# Patient Record
Sex: MALE | Race: OTHER | Employment: OTHER
[De-identification: names, ages, dates, MRNs, and addresses within clinical notes are randomized per-mention and may not be internally consistent; named-entity substitution may affect disease eponyms.]

---

## 2022-03-18 PROBLEM — R55 SYNCOPE AND COLLAPSE: Status: ACTIVE | Noted: 2019-04-24

## 2022-03-19 PROBLEM — S00.93XA TRAUMATIC CEPHALOHEMATOMA: Status: ACTIVE | Noted: 2019-04-24

## 2022-03-19 PROBLEM — S06.6XAA SUBARACHNOID HEMORRHAGE FOLLOWING INJURY (HCC): Status: ACTIVE | Noted: 2019-04-24

## 2024-01-03 ENCOUNTER — HOSPITAL ENCOUNTER (OUTPATIENT)
Facility: HOSPITAL | Age: 54
Setting detail: RECURRING SERIES
Discharge: HOME | End: 2024-01-06
Payer: COMMERCIAL

## 2024-01-03 PROCEDURE — 97161 PT EVAL LOW COMPLEX 20 MIN: CPT

## 2024-01-03 PROCEDURE — 97162 PT EVAL MOD COMPLEX 30 MIN: CPT

## 2024-01-03 NOTE — THERAPY EVALUATION
[facility]  [address] [place] [address]  Phone: [phone]   [phone]  Plan of Care / Statement of Necessity for Physical Therapy Services     Patient Name: Reinier Rossi : 1970   Medical   Diagnosis: Cervicalgia [M54.2] Treatment Diagnosis:   M54.2  NECK PAIN    Onset Date:      Referral Source: Radha Ca MD Start of Care (SOC): 1/3/2024   Prior Hospitalization: See medical history Provider #: [id number]   Prior Level of Function: Indep, works full time construction, [employer]   Comorbidities: Smoking > 40 years (1/2 pack a day), h/o HA, depression, chronic rhinitis, B sensorineural hearing loss      Assessment / key information:      Pt is a 53 y.o. year old RHD male with subjective complaints of  \"constant feeling of something behind the R side of jaw, feels like a bruise when touched, radiates into the jaw with interesting sensation at front of teeth.\" Pt reports that he is unable to recall when his pain initially started but feels it was in 2019 after a fall. Notes that he was severely dehydrated, passed out, and fell backwards, hitting his head. Pt did go to the ER for that and was d/c following medical clearance, CT Head performed. (See chart for further details)Pt notes that he has a h/o of \"sinus problems and allergies\" as well as a h/o of headaches. HA mostly occurred at least 1x/mo with sensitivity to light, sound, and presence of pain on the R side of neck, shoulder, and jaw, treated by ice and stretching. Over the last year, his HA have increased and pt with presence of pressure/pain in the R ear with muffled hearing. He also notes having lesions in his mouth with drainage of blood that he was told were \"canker sores\".  He also reports an enlarged \"lymph node\" on the R side of his neck/jaw line. Pt has seen multiple physicians for \"multiple opinions\" noting that he has been to PCP, dentist, and ENT and was

## 2024-01-03 NOTE — PROGRESS NOTES
PHYSICAL / OCCUPATIONAL THERAPY - DAILY TREATMENT NOTE (updated )  For Eval visit    Patient Name: Reinier Rossi    Date: 1/3/2024    : 1970  Insurance: Payor: /      Patient  verified yes     Visit #   Current / Total 1 12   Time   In / Out 1029 1110   Pain   In / Out 0/10 0/10   Subjective Functional Status/Changes: See POC     TREATMENT AREA =  see POC    OBJECTIVE        30 min   Eval - untimed                      Therapeutic Procedures:    Tx Min Billable or 1:1 Min (if diff from Tx Min) Procedure, Rationale, Specifics   7 [place] [address] Manual Therapy (timed):  decrease pain, increase ROM, increase tissue extensibility, decrease trigger points, and increase postural awareness to improve patient's ability to progress to PLOF and address remaining functional goals.  The manual therapy interventions were performed at a separate and distinct time from the therapeutic activities interventions . (see flow sheet as applicable)     Details if applicable:       5 [place] [address] Self Care/Home Management (timed):  improve patient knowledge and understanding of pain reducing techniques, positioning, posture/ergonomics, home safety, activity modification, diagnosis/prognosis, and physical therapy expectations, procedures and progression  to improve patient's ability to progress to PLOF and address remaining functional goals.  (see flow sheet as applicable)     Details if applicable:            Details if applicable:            Details if applicable:            Details if applicable:     11 (eval only) [facility] Totals Reminder: bill using total billable min of TIMED therapeutic procedures (example: do not include dry needle or estim unattended, both untimed codes, in totals to left)  8-22 min = 1 unit; 23-37 min = 2 units; 38-52 min = 3 units; 53-67 min = 4 units; 68-82 min = 5 units   Total Total     [x]  Patient Education billed concurrently with other procedures   [] Review HEP    [] Progressed/Changed HEP,

## 2024-01-08 ENCOUNTER — HOSPITAL ENCOUNTER (OUTPATIENT)
Facility: HOSPITAL | Age: 54
Setting detail: RECURRING SERIES
Discharge: HOME | End: 2024-01-11
Payer: COMMERCIAL

## 2024-01-08 PROCEDURE — 97110 THERAPEUTIC EXERCISES: CPT

## 2024-01-08 PROCEDURE — 97140 MANUAL THERAPY 1/> REGIONS: CPT

## 2024-01-08 PROCEDURE — 97535 SELF CARE MNGMENT TRAINING: CPT

## 2024-01-08 NOTE — PROGRESS NOTES
address imbalance/dizziness, and instruct in home and community integration to address functional deficits and attain remaining goals.    Progress toward goals / Updated goals:  []  See POC  Short Term Goals: To be accomplished in  1-2  weeks:  1. Independent with HEP.  EVAL: NA  1/8/24: HEP issued  2. Decrease max pain 25-50% to assist with tolerance to daily activity.  EVAL: 0-4/10  1/8/24: Pt denied any pain before/after tx session  3. Pt will report no radicular S&S in the UE or jaw.   Eval: intermittent n/t in the RUE, along ulnar nn distribution, and reports of \"heaviness\" in the jaw, pain posterior jaw on the R  1/8/24: Pt denied any radicular S&S today in the UE, noted reduced feeling of Heaviness in the jaw overall     Long Term Goals: To be accomplished in  4-6  weeks:  1.  Decrease max pain 50-75% to assist with daily activity tolerance.  EVAL: 0-4/10  2.  Improved CV endurance test to 10-15 sec in order to improve postural awareness with functional tasks.  EVAL: 8 seconds  3.  Pt will demo improved c/s Rotation to 80 deg bilaterally in order to improve tolerance to looking over shoulder while driving, performing work duties.   EVAL:  ROT R 60, L 65    PLAN  yes Continue plan of care  []  Other:      Alicia Rudolph PT    1/8/2024    9:48 AM    Future Appointments   Date Time Provider Department Center   1/8/2024  9:00 AM Alicia Rudolph PT [facility]   1/10/2024  9:00 AM Stephie Rodriguez PT [facility]   1/15/2024  9:00 AM Khanh Newell PTA [facility]   1/18/2024  9:40 AM Alicia Rudolph PT [facility]   1/22/2024  9:00 AM Khanh Newell PTA [facility]   1/24/2024  9:00 AM Alicia Rudolph PT [facility]   1/29/2024  9:40 AM Khanh Newell PTA [facility]   1/31/2024  9:40 AM Vic Ansari, PT [facility]

## 2024-01-09 NOTE — PROGRESS NOTES
PHYSICAL / OCCUPATIONAL THERAPY - DAILY TREATMENT NOTE (updated )  For Eval visit    Patient Name: Reinier Rossi    Date: 1/10/2024    : 1970  Insurance: Payor: [employer] / Plan: [employer] [facility] [employer] / Product Type: *No Product type* /      Patient  verified yes     Visit #   Current / Total 3 12   Time   In / Out 9:42 10:22   Pain   In / Out 0 0   Subjective Functional Status/Changes: Pt states he had a \"little catch\" to left ant ribs after last session that lasted a couple hours and resolved with some stretching. Yesterday he had L shoulder and mid back pain up to 2/10 \"it was just irritating\". States the pressure in his jaw is now equalized and ear's not as stuffy on R.     TREATMENT AREA =  M54.2  NECK PAIN     OBJECTIVE      Therapeutic Procedures:    Tx Min Billable or 1:1 Min (if diff from Tx Min) Procedure, Rationale, Specifics   10 10 70433 Manual Therapy (timed):  decrease pain, increase ROM, increase tissue extensibility, decrease trigger points, and increase postural awareness to improve patient's ability to progress to PLOF and address remaining functional goals.  The manual therapy interventions were performed at a separate and distinct time from the therapeutic activities interventions . (see flow sheet as applicable)     Details if applicable:   suboccipital release, STM R>L c/s paraspinals with TpR R TP C6 and C2. Manual R LS stretch.    30 30 08890 Therapeutic Exercise (timed):  increase ROM, strength, coordination, balance, and proprioception to improve patient's ability to progress to PLOF and address remaining functional goals. (see flow sheet as applicable)     Details if applicable:            Details if applicable:            Details if applicable:            Details if applicable:     40 40 [facility] Totals Reminder: bill using total billable min of TIMED therapeutic procedures (example: do not include dry needle or estim unattended, both untimed codes, in totals to

## 2024-01-10 ENCOUNTER — HOSPITAL ENCOUNTER (OUTPATIENT)
Facility: HOSPITAL | Age: 54
Setting detail: RECURRING SERIES
Discharge: HOME | End: 2024-01-13
Payer: COMMERCIAL

## 2024-01-10 PROCEDURE — 97140 MANUAL THERAPY 1/> REGIONS: CPT

## 2024-01-10 PROCEDURE — 97110 THERAPEUTIC EXERCISES: CPT

## 2024-01-15 ENCOUNTER — HOSPITAL ENCOUNTER (OUTPATIENT)
Facility: HOSPITAL | Age: 54
Setting detail: RECURRING SERIES
Discharge: HOME | End: 2024-01-18
Payer: COMMERCIAL

## 2024-01-15 PROCEDURE — 97110 THERAPEUTIC EXERCISES: CPT

## 2024-01-15 PROCEDURE — 97140 MANUAL THERAPY 1/> REGIONS: CPT

## 2024-01-15 NOTE — PROGRESS NOTES
PHYSICAL / OCCUPATIONAL THERAPY - DAILY TREATMENT NOTE    Patient Name: Reinier Rossi    Date: 1/15/2024    : 1970  Insurance: Payor: [employer]H / Plan: [employer] / Product Type: *No Product type* /      Patient  verified Yes     Visit #   Current / Total 4 12   Time   In / Out 902 955   Pain   In / Out 0 0   Subjective Functional Status/Changes: Pt states neck feels about the same today. Reports no pain or headaches.     TREATMENT AREA =  Cervicalgia [M54.2]    OBJECTIVE         Therapeutic Procedures:    Tx Min Billable or 1:1 Min (if diff from Tx Min) Procedure, Rationale, Specifics   38  23908 Therapeutic Exercise (timed):  increase ROM, strength, coordination, balance, and proprioception to improve patient's ability to progress to PLOF and address remaining functional goals. (see flow sheet as applicable)     Details if applicable:       15  24941 Manual Therapy (timed):  decrease pain, increase ROM, increase tissue extensibility, decrease trigger points, and increase postural awareness to improve patient's ability to progress to PLOF and address remaining functional goals.  The manual therapy interventions were performed at a separate and distinct time from the therapeutic activities interventions . (see flow sheet as applicable)     Details if applicable:  suboccipital release, STM R>L c/s paraspinals with TpR R TP C6 and C2. Manual R LS stretch.            Details if applicable:            Details if applicable:            Details if applicable:     53  [facility] Totals Reminder: bill using total billable min of TIMED therapeutic procedures (example: do not include dry needle or estim unattended, both untimed codes, in totals to left)  8-22 min = 1 unit; 23-37 min = 2 units; 38-52 min = 3 units; 53-67 min = 4 units; 68-82 min = 5 units   Total Total     [x]  Patient Education billed concurrently with other procedures   [x] Review HEP    [] Progressed/Changed HEP, detail:    [] Other

## 2024-01-18 ENCOUNTER — HOSPITAL ENCOUNTER (OUTPATIENT)
Facility: HOSPITAL | Age: 54
Setting detail: RECURRING SERIES
Discharge: HOME | End: 2024-01-21
Payer: COMMERCIAL

## 2024-01-18 PROCEDURE — 97112 NEUROMUSCULAR REEDUCATION: CPT

## 2024-01-18 PROCEDURE — 97110 THERAPEUTIC EXERCISES: CPT

## 2024-01-18 NOTE — PROGRESS NOTES
PHYSICAL / OCCUPATIONAL THERAPY - DAILY TREATMENT NOTE    Patient Name: Reinier Rossi    Date: 2024    : 1970  Insurance: Payor: [employer]H / Plan: [employer] / Product Type: *No Product type* /      Patient  verified Yes     Visit #   Current / Total 5 12   Time   In / Out 942 1028   Pain   In / Out 0 0   Subjective Functional Status/Changes: Pt reports no pain or discomfort today. Reports soreness after new exercises last visit. States he has been more consistent with stretching at home.     TREATMENT AREA =  Cervicalgia [M54.2]    OBJECTIVE         Therapeutic Procedures:    Tx Min Billable or 1:1 Min (if diff from Tx Min) Procedure, Rationale, Specifics   30  95427 Therapeutic Exercise (timed):  increase ROM, strength, coordination, balance, and proprioception to improve patient's ability to progress to PLOF and address remaining functional goals. (see flow sheet as applicable)     Details if applicable:            16  20131 Neuromuscular Re-Education (timed):  improve balance, coordination, kinesthetic sense, posture, core stability and proprioception to improve patient's ability to develop conscious control of individual muscles and awareness of position of extremities in order to progress to PLOF and address remaining functional goals. (see flow sheet as applicable)     Details if applicable:     X  59686 Therapeutic Activity (timed):  use of dynamic activities replicating functional movements to increase ROM, strength, coordination, balance, and proprioception in order to improve patient's ability to progress to PLOF and address remaining functional goals.  (see flow sheet as applicable)     Details if applicable:            Details if applicable:     46  [facility] Totals Reminder: bill using total billable min of TIMED therapeutic procedures (example: do not include dry needle or estim unattended, both untimed codes, in totals to left)  8-22 min = 1 unit; 23-37 min = 2 units; 38-52

## 2024-01-22 ENCOUNTER — HOSPITAL ENCOUNTER (OUTPATIENT)
Facility: HOSPITAL | Age: 54
Setting detail: RECURRING SERIES
Discharge: HOME | End: 2024-01-25
Payer: COMMERCIAL

## 2024-01-22 PROCEDURE — 97112 NEUROMUSCULAR REEDUCATION: CPT

## 2024-01-22 PROCEDURE — 97140 MANUAL THERAPY 1/> REGIONS: CPT

## 2024-01-22 PROCEDURE — 97110 THERAPEUTIC EXERCISES: CPT

## 2024-01-22 NOTE — PROGRESS NOTES
PHYSICAL / OCCUPATIONAL THERAPY - DAILY TREATMENT NOTE    Patient Name: Reinier Rossi    Date: 2024    : 1970  Insurance: Payor: [employer]H / Plan: [employer] [facility] [employer] / Product Type: *No Product type* /      Patient  verified Yes     Visit #   Current / Total 6 12   Time   In / Out 900 954   Pain   In / Out 0/10 0/10   Subjective Functional Status/Changes: Pt states he felt a lot looser after new exercises added last visit. States he still feels heaviness in his jaw and front of his mouth but with decreased frequency and intensity.     TREATMENT AREA =  Cervicalgia [M54.2]    OBJECTIVE         Therapeutic Procedures:    Tx Min Billable or 1:1 Min (if diff from Tx Min) Procedure, Rationale, Specifics   30  83471 Therapeutic Exercise (timed):  increase ROM, strength, coordination, balance, and proprioception to improve patient's ability to progress to PLOF and address remaining functional goals. (see flow sheet as applicable)     Details if applicable:       16  93586 Neuromuscular Re-Education (timed):  improve balance, coordination, kinesthetic sense, posture, core stability and proprioception to improve patient's ability to develop conscious control of individual muscles and awareness of position of extremities in order to progress to PLOF and address remaining functional goals. (see flow sheet as applicable)     Details if applicable:     8  60096 Manual Therapy (timed):  decrease pain, increase ROM, increase tissue extensibility, and decrease trigger points to improve patient's ability to progress to PLOF and address remaining functional goals.  The manual therapy interventions were performed at a separate and distinct time from the therapeutic activities interventions . (see flow sheet as applicable)     Details if applicable:  suboccipital release, STM R>L c/s paraspinals Manual R LS stretch.             Details if applicable:            Details if applicable:     54   [facility] Totals

## 2024-01-24 ENCOUNTER — HOSPITAL ENCOUNTER (OUTPATIENT)
Facility: HOSPITAL | Age: 54
Setting detail: RECURRING SERIES
Discharge: HOME | End: 2024-01-27
Payer: COMMERCIAL

## 2024-01-24 ENCOUNTER — APPOINTMENT (OUTPATIENT)
Facility: HOSPITAL | Age: 54
End: 2024-01-24
Payer: COMMERCIAL

## 2024-01-24 PROCEDURE — 97110 THERAPEUTIC EXERCISES: CPT

## 2024-01-24 PROCEDURE — 97112 NEUROMUSCULAR REEDUCATION: CPT

## 2024-01-24 PROCEDURE — 97140 MANUAL THERAPY 1/> REGIONS: CPT

## 2024-01-24 NOTE — PROGRESS NOTES
PHYSICAL / OCCUPATIONAL THERAPY - DAILY TREATMENT NOTE    Patient Name: Reinier Rossi    Date: 2024    : 1970  Insurance: Payor: NASH / Plan: [employer] / Product Type: *No Product type* /      Patient  verified Yes     Visit #   Current / Total 7 12   Time   In / Out 0944  1025    Pain   In / Out 0 /10 0 /10   Subjective Functional Status/Changes: Pt reports that he \"feels a little tight\" this morning, noting that he did not sleep well last night.      TREATMENT AREA =  Cervicalgia [M54.2]    OBJECTIVE         Therapeutic Procedures:    Tx Min Billable or 1:1 Min (if diff from Tx Min) Procedure, Rationale, Specifics   20  18046 Therapeutic Exercise (timed):  increase ROM, strength, coordination, balance, and proprioception to improve patient's ability to progress to PLOF and address remaining functional goals. (see flow sheet as applicable)     Details if applicable:       11  58498 Neuromuscular Re-Education (timed):  improve balance, coordination, kinesthetic sense, posture, core stability and proprioception to improve patient's ability to develop conscious control of individual muscles and awareness of position of extremities in order to progress to PLOF and address remaining functional goals. (see flow sheet as applicable)     Details if applicable:     10  68300 Manual Therapy (timed):  decrease pain, increase ROM, increase tissue extensibility, and decrease trigger points to improve patient's ability to progress to PLOF and address remaining functional goals.  The manual therapy interventions were performed at a separate and distinct time from the therapeutic activities interventions . (see flow sheet as applicable)     Details if applicable:  suboccipital release, STM R>L c/s paraspinals Manual R UT stretch, gentle OA posterior glide on the R with MET, grade II/III PA glides C2-5 followed by gentle neck ROM. STM B masseter musculature and ante-auricular space.

## 2024-01-29 ENCOUNTER — HOSPITAL ENCOUNTER (OUTPATIENT)
Facility: HOSPITAL | Age: 54
Setting detail: RECURRING SERIES
Discharge: HOME | End: 2024-02-01
Payer: COMMERCIAL

## 2024-01-29 PROCEDURE — 97530 THERAPEUTIC ACTIVITIES: CPT

## 2024-01-29 PROCEDURE — 97110 THERAPEUTIC EXERCISES: CPT

## 2024-01-29 NOTE — PROGRESS NOTES
PHYSICAL / OCCUPATIONAL THERAPY - DAILY TREATMENT NOTE    Patient Name: Reinier Rossi    Date: 2024    : 1970  Insurance: Payor: [employer]H / Plan: [employer] / Product Type: *No Product type* /      Patient  verified Yes     Visit #   Current / Total 8 12   Time   In / Out 948 1030   Pain   In / Out 0 0   Subjective Functional Status/Changes: Pt states he still has a clogging feeling in his sinus and right cheek. Reports decreased muscle tightness along the right side of his face. Reports overall improvement since starting therapy.     TREATMENT AREA =  Cervicalgia [M54.2]    OBJECTIVE         Therapeutic Procedures:    Tx Min Billable or 1:1 Min (if diff from Tx Min) Procedure, Rationale, Specifics   10  42704 Therapeutic Exercise (timed):  increase ROM, strength, coordination, balance, and proprioception to improve patient's ability to progress to PLOF and address remaining functional goals. (see flow sheet as applicable)     Details if applicable:       32  11106 Therapeutic Activity (timed):  use of dynamic activities replicating functional movements to increase ROM, strength, coordination, balance, and proprioception in order to improve patient's ability to progress to PLOF and address remaining functional goals.  (see flow sheet as applicable)     Details if applicable:            Details if applicable:            Details if applicable:            Details if applicable:     42  [facility] Totals Reminder: bill using total billable min of TIMED therapeutic procedures (example: do not include dry needle or estim unattended, both untimed codes, in totals to left)  8-22 min = 1 unit; 23-37 min = 2 units; 38-52 min = 3 units; 53-67 min = 4 units; 68-82 min = 5 units   Total Total     [x]  Patient Education billed concurrently with other procedures   [x] Review HEP    [] Progressed/Changed HEP, detail:    [] Other detail:       Objective Information/Functional Measures/Assessment    See

## 2024-01-29 NOTE — PROGRESS NOTES
[facility]  [address] [place] [address]  Phone: [phone]   [phone]  PHYSICAL THERAPY PROGRESS NOTE  Patient Name: Reinier Rossi : 1970   Treatment/Medical Diagnosis: Cervicalgia [M54.2]   Referral Source: Radha Ca MD     Date of Initial Visit: 1/3/2024 Attended Visits: 8 Missed Visits: 0     SUMMARY OF TREATMENT  Physical therapy has consisted of therapeutic exercises for increased c/s strength, ROM, and flexibility. Therapeutic activities performed to improve functional activities, model real life movements and performance specific to the patient's need with supervision to return the patient to their PLOF.  Neuromuscular Re-ed performed to improve coordination, improve mm activation, improve proprioception to improve the patient's ability to perform ADL/IADL's.  Manual therapy to c/s for decreased muscle hypertonicity and increased ROM/flexibility.  Pt education provided regarding HEP.    CURRENT STATUS  Pt reports 40-60% improvement since starting therapy. Pt has attended 8 visits since start of therapy and has missed 0 visits. Pt has met 1 out of 6 goals and is progressing towards remaining goals. Pt reports decreased pain levels and reports a decrease in radicular sx since starting therapy. C/s AROM is improving but continues to be limited with side bending and rotation. Periscapular strength and endurance is improving but continues to be limited. Pt can benefit from additional skilled therapy to increase c/s AROM, increase periscapular functional strength, and decrease radicular sx for ease of ADL's and improved work activity tolerance.    % improvement: 40-60%  Max pain 7/10  Avg pain 2-3  Min pain 1/10    Current improvements: Improved c/s mobility, improved work activity tolerance, decreased shoulder pain, increased shoulder mobility, improved sleeping tolerance,   Remaining functional limitations:

## 2024-01-31 ENCOUNTER — HOSPITAL ENCOUNTER (OUTPATIENT)
Facility: HOSPITAL | Age: 54
Setting detail: RECURRING SERIES
Discharge: HOME | End: 2024-02-03
Payer: COMMERCIAL

## 2024-01-31 PROCEDURE — 97110 THERAPEUTIC EXERCISES: CPT

## 2024-01-31 PROCEDURE — 97112 NEUROMUSCULAR REEDUCATION: CPT

## 2024-01-31 PROCEDURE — 97530 THERAPEUTIC ACTIVITIES: CPT

## 2024-01-31 NOTE — PROGRESS NOTES
PHYSICAL / OCCUPATIONAL THERAPY - DAILY TREATMENT NOTE    Patient Name: Reinier Rossi    Date: 2024    : 1970  Insurance: Payor: [employer]H / Plan: [employer] / Product Type: *No Product type* /      Patient  verified Yes     Visit #   Current / Total 9 12   Time   In / Out 943 1029   Pain   In / Out 0/10 010   Subjective Functional Status/Changes: Patient reports he has been feeling good since his last appointment. Patient noticing some minor tension at his neck and around his jaw line, but overall feels he has been doing better with PT.      TREATMENT AREA =  Cervicalgia [M54.2]    OBJECTIVE         Therapeutic Procedures:    Tx Min Billable or 1:1 Min (if diff from Tx Min) Procedure, Rationale, Specifics   20 20 53629 Therapeutic Exercise (timed):  increase ROM, strength, coordination, balance, and proprioception to improve patient's ability to progress to PLOF and address remaining functional goals. (see flow sheet as applicable)     Details if applicable:       16  93946 Therapeutic Activity (timed):  use of dynamic activities replicating functional movements to increase ROM, strength, coordination, balance, and proprioception in order to improve patient's ability to progress to PLOF and address remaining functional goals.  (see flow sheet as applicable)     Details if applicable:     10 10 51025 Neuromuscular Re-Education (timed):  improve balance, coordination, kinesthetic sense, posture, core stability and proprioception to improve patient's ability to develop conscious control of individual muscles and awareness of position of extremities in order to progress to PLOF and address remaining functional goals. (see flow sheet as applicable)     Details if applicable:            Details if applicable:            Details if applicable:     46 46 [facility] Totals Reminder: bill using total billable min of TIMED therapeutic procedures (example: do not include dry needle or estim

## 2024-02-05 ENCOUNTER — HOSPITAL ENCOUNTER (OUTPATIENT)
Facility: HOSPITAL | Age: 54
Setting detail: RECURRING SERIES
Discharge: HOME | End: 2024-02-08
Payer: COMMERCIAL

## 2024-02-05 PROCEDURE — 97530 THERAPEUTIC ACTIVITIES: CPT

## 2024-02-05 PROCEDURE — 97112 NEUROMUSCULAR REEDUCATION: CPT

## 2024-02-05 PROCEDURE — 97110 THERAPEUTIC EXERCISES: CPT

## 2024-02-05 NOTE — PROGRESS NOTES
PHYSICAL / OCCUPATIONAL THERAPY - DAILY TREATMENT NOTE    Patient Name: Reinier Rossi    Date: 2024    : 1970  Insurance: Payor: [employer]H / Plan: [employer] / Product Type: *No Product type* /      Patient  verified Yes     Visit #   Current / Total 10 12   Time   In / Out 942 1024   Pain   In / Out 0 0   Subjective Functional Status/Changes: Pt reports soreness in his right shoulder blade but no pain. Reports heaviness in his jaw has improved and gets occasional popping in his ear.       TREATMENT AREA =  Cervicalgia [M54.2]    OBJECTIVE         Therapeutic Procedures:    Tx Min Billable or 1:1 Min (if diff from Tx Min) Procedure, Rationale, Specifics   20 20 20539 Therapeutic Exercise (timed):  increase ROM, strength, coordination, balance, and proprioception to improve patient's ability to progress to PLOF and address remaining functional goals. (see flow sheet as applicable)     Details if applicable:       10 10 25366 Therapeutic Activity (timed):  use of dynamic activities replicating functional movements to increase ROM, strength, coordination, balance, and proprioception in order to improve patient's ability to progress to PLOF and address remaining functional goals.  (see flow sheet as applicable)     Details if applicable:     12 12 01397 Neuromuscular Re-Education (timed):  improve balance, coordination, kinesthetic sense, posture, core stability and proprioception to improve patient's ability to develop conscious control of individual muscles and awareness of position of extremities in order to progress to PLOF and address remaining functional goals. (see flow sheet as applicable)     Details if applicable:            Details if applicable:            Details if applicable:     42 42 [facility] Totals Reminder: bill using total billable min of TIMED therapeutic procedures (example: do not include dry needle or estim unattended, both untimed codes, in totals to left)  8-22 min =

## 2024-02-07 ENCOUNTER — HOSPITAL ENCOUNTER (OUTPATIENT)
Facility: HOSPITAL | Age: 54
Setting detail: RECURRING SERIES
Discharge: HOME | End: 2024-02-10
Payer: COMMERCIAL

## 2024-02-07 PROCEDURE — 97535 SELF CARE MNGMENT TRAINING: CPT

## 2024-02-07 PROCEDURE — 97110 THERAPEUTIC EXERCISES: CPT

## 2024-02-07 NOTE — PROGRESS NOTES
PHYSICAL / OCCUPATIONAL THERAPY - DAILY TREATMENT NOTE    Patient Name: Reinier Rossi    Date: 2024    : 1970  Insurance: Payor: [employer] / Plan: [employer] [facility] [employer] / Product Type: *No Product type* /      Patient  verified Yes     Visit #   Current / Total 11 12    Time   In / Out 0949  1030    Pain   In / Out 0/10  010    Subjective Functional Status/Changes: \"Woke up feeling pretty clean today\". Pt notes that his symptoms have greatly reduced and feels that the taste that he was having in his mouth is less \"metallic\". . Pt reports that his S&S have improved \"~40%\".       TREATMENT AREA =  Cervicalgia [M54.2]    OBJECTIVE         Therapeutic Procedures:    Tx Min Billable or 1:1 Min (if diff from Tx Min) Procedure, Rationale, Specifics   25  50670 Therapeutic Exercise (timed):  increase ROM, strength, coordination, balance, and proprioception to improve patient's ability to progress to PLOF and address remaining functional goals. (see flow sheet as applicable)     Details if applicable:         04253 Therapeutic Activity (timed):  use of dynamic activities replicating functional movements to increase ROM, strength, coordination, balance, and proprioception in order to improve patient's ability to progress to PLOF and address remaining functional goals.  (see flow sheet as applicable)     Details if applicable:     16  41273 Self Care/Home Management (timed):  improve patient knowledge and understanding of posture/ergonomics, activity modification, diagnosis/prognosis, and anatomy of the upper quadrant  to improve patient's ability to progress to PLOF and address remaining functional goals.  (see flow sheet as applicable)     Details if applicable:  - edu on anatomy of the facial nerve and the trigeminal nerve.  -utilized pictures to explain patterns of pain associated with these nerves          Details if applicable:            Details if applicable:     41   BC Totals Reminder: bill

## 2024-02-12 ENCOUNTER — HOSPITAL ENCOUNTER (OUTPATIENT)
Facility: HOSPITAL | Age: 54
Setting detail: RECURRING SERIES
Discharge: HOME | End: 2024-02-15
Payer: COMMERCIAL

## 2024-02-12 PROCEDURE — 97530 THERAPEUTIC ACTIVITIES: CPT

## 2024-02-12 PROCEDURE — 97110 THERAPEUTIC EXERCISES: CPT

## 2024-02-12 NOTE — PROGRESS NOTES
[facility]  [address] [place] [address]  Phone: [phone]   [phone]  PHYSICAL THERAPY PROGRESS NOTE  Patient Name: Reinier Rossi : 1970   Treatment/Medical Diagnosis: Cervicalgia [M54.2]   Referral Source: Radha Ca MD     Date of Initial Visit: 1/3/2024 Attended Visits: 12 Missed Visits: 0     SUMMARY OF TREATMENT  Physical therapy has consisted of therapeutic exercises for increased c/s strength, ROM, and flexibility. Therapeutic activities performed to improve functional activities, model real life movements and performance specific to the patient's need with supervision to return the patient to their PLOF.  Neuromuscular Re-ed performed to improve coordination, improve mm activation, improve proprioception to improve the patient's ability to perform ADL/IADL's.  Manual therapy to c/s for decreased muscle hypertonicity and increased ROM/flexibility.  Pt education provided regarding HEP.    CURRENT STATUS  Pt reports that since the last PN (24) he has had 30-35% improvement in his S&S. He has attended 12 total therapy sessions and has not missed any visits. He has reported a reduction in his smoking, improvement in his ability to perform overhead tasks for his work and improved postural awareness with all activity. He also notes reduction in facial S&S and \"feeling of ear being clogged and heaviness in the jaw\" on the R. Pt has been progressing well towards higher level activity but is limited by cont S&S. Pt has not met any of the updated goals from his last re-assessment but is progressing well towards all. Therapist did assess shoulder further today, does have (+) Neers impingement test but otherwise no other concerns noted. Would benefit and is requesting  additional skilled therapy to increase c/s AROM, increase periscapular functional strength, and decrease radicular sx for ease of ADL's and improved 
total billable min of TIMED therapeutic procedures (example: do not include dry needle or estim unattended, both untimed codes, in totals to left)  8-22 min = 1 unit; 23-37 min = 2 units; 38-52 min = 3 units; 53-67 min = 4 units; 68-82 min = 5 units   Total Total     [x]  Patient Education billed concurrently with other procedures   [x] Review HEP    [] Progressed/Changed HEP, detail:    [] Other detail:       Objective Information/Functional Measures/Assessment    Refer to PN    Patient will continue to benefit from skilled PT / OT services to modify and progress therapeutic interventions, analyze and address functional mobility deficits, analyze and address ROM deficits, analyze and address strength deficits, analyze and address soft tissue restrictions, analyze and cue for proper movement patterns, and analyze and modify for postural abnormalities to address functional deficits and attain remaining goals.    Progress toward goals / Updated goals:  [x]  See Progress Note/Recertification    Next PN/ RC  3/12/24  Auth due (visit number/ date) 13 v; 3/1/24    PLAN  - Continue Plan of Care  - Upgrade activities as tolerated    Alicia Rudolph PT    2/12/2024    8:47 AM    Future Appointments   Date Time Provider Department Center   2/12/2024  9:40 AM Alicia Rudolph PT [facility]   2/20/2024  9:40 AM Alicia Rudolph PT [facility]

## 2024-02-14 ENCOUNTER — APPOINTMENT (OUTPATIENT)
Facility: HOSPITAL | Age: 54
End: 2024-02-14
Payer: COMMERCIAL

## 2024-02-20 ENCOUNTER — HOSPITAL ENCOUNTER (OUTPATIENT)
Facility: HOSPITAL | Age: 54
Setting detail: RECURRING SERIES
Discharge: HOME | End: 2024-02-23
Payer: COMMERCIAL

## 2024-02-20 PROCEDURE — 97112 NEUROMUSCULAR REEDUCATION: CPT

## 2024-02-20 PROCEDURE — 97110 THERAPEUTIC EXERCISES: CPT

## 2024-02-20 NOTE — PROGRESS NOTES
PHYSICAL / OCCUPATIONAL THERAPY - DAILY TREATMENT NOTE    Patient Name: Reinier Rossi    Date: 2024    : 1970  Insurance: Payor: [employer] / Plan: [employer] [facility] [employer] / Product Type: *No Product type* /      Patient  verified Yes     Visit #   Current / Total 13 16-24   Time   In / Out 0940  1027    Pain   In / Out  0/10   0 /10    Subjective Functional Status/Changes: Pt reports that after the last session, he had inc \"feeling of crowding and metallic taste in front of the mouth/tongue\". Since then, he said it has mostly resolved and stated \"today is the best day I have had in a really long time. I was able to taste bitter coffee this morning\". Pt notes inc ability to taste and smell more since starting therapy and also states \" my sinuses clear more, and the pressure almost gone by my ear and jaw\".     TREATMENT AREA =  Cervicalgia [M54.2]    OBJECTIVE    Therapeutic Procedures:    Tx Min Billable or 1:1 Min (if diff from Tx Min) Procedure, Rationale, Specifics   20  97961 Therapeutic Exercise (timed):  increase ROM, strength, coordination, balance, and proprioception to improve patient's ability to progress to PLOF and address remaining functional goals. (see flow sheet as applicable)     Details if applicable:       27  20695 Neuromuscular Re-Education (timed):  improve balance, coordination, kinesthetic sense, posture, core stability and proprioception to improve patient's ability to develop conscious control of individual muscles and awareness of position of extremities in order to progress to PLOF and address remaining functional goals. (see flow sheet as applicable)     Details if applicable:  use of laser during UE movements, pt performing in slow/concentrated movement patterns    x  00264 Self Care/Home Management (timed):  improve patient knowledge and understanding of posture/ergonomics, activity modification, diagnosis/prognosis, and anatomy of the upper quadrant  to improve

## 2024-02-27 ENCOUNTER — HOSPITAL ENCOUNTER (OUTPATIENT)
Facility: HOSPITAL | Age: 54
Setting detail: RECURRING SERIES
Discharge: HOME | End: 2024-03-01
Payer: COMMERCIAL

## 2024-02-27 PROCEDURE — 97110 THERAPEUTIC EXERCISES: CPT

## 2024-02-27 PROCEDURE — 97112 NEUROMUSCULAR REEDUCATION: CPT

## 2024-02-27 PROCEDURE — 97530 THERAPEUTIC ACTIVITIES: CPT

## 2024-02-27 NOTE — PROGRESS NOTES
PHYSICAL / OCCUPATIONAL THERAPY - DAILY TREATMENT NOTE    Patient Name: Reinier Rossi    Date: 2024    : 1970  Insurance: Payor: [employer] / Plan: [employer] [facility] [employer] / Product Type: *No Product type* /      Patient  verified Yes     Visit #   Current / Total 14 16-24   Time   In / Out 820 900   Pain   In / Out 0/10 0/10   Subjective Functional Status/Changes: Patient reports his neck and upper back have been doing well since his LV and denies any complications since this time.      TREATMENT AREA =  Cervicalgia [M54.2]    OBJECTIVE         Therapeutic Procedures:    Tx Min Billable or 1:1 Min (if diff from Tx Min) Procedure, Rationale, Specifics   20  44994 Therapeutic Exercise (timed):  increase ROM, strength, coordination, balance, and proprioception to improve patient's ability to progress to PLOF and address remaining functional goals. (see flow sheet as applicable)     Details if applicable:       10 10 62191 Neuromuscular Re-Education (timed):  improve balance, coordination, kinesthetic sense, posture, core stability and proprioception to improve patient's ability to develop conscious control of individual muscles and awareness of position of extremities in order to progress to PLOF and address remaining functional goals. (see flow sheet as applicable)     Details if applicable:     10 10 39282 Therapeutic Activity (timed):  use of dynamic activities replicating functional movements to increase ROM, strength, coordination, balance, and proprioception in order to improve patient's ability to progress to PLOF and address remaining functional goals.  (see flow sheet as applicable)     Details if applicable:            Details if applicable:            Details if applicable:     40 40 [facility] Totals Reminder: bill using total billable min of TIMED therapeutic procedures (example: do not include dry needle or estim unattended, both untimed codes, in totals to left)  8-22 min = 1 unit; 23-37

## 2024-03-05 ENCOUNTER — HOSPITAL ENCOUNTER (OUTPATIENT)
Facility: HOSPITAL | Age: 54
Setting detail: RECURRING SERIES
Discharge: HOME | End: 2024-03-08
Payer: COMMERCIAL

## 2024-03-05 PROCEDURE — 97530 THERAPEUTIC ACTIVITIES: CPT

## 2024-03-05 PROCEDURE — 97110 THERAPEUTIC EXERCISES: CPT

## 2024-03-05 PROCEDURE — 97112 NEUROMUSCULAR REEDUCATION: CPT

## 2024-03-05 NOTE — PROGRESS NOTES
PHYSICAL / OCCUPATIONAL THERAPY - DAILY TREATMENT NOTE    Patient Name: Reinier Rossi    Date: 3/5/2024    : 1970  Insurance: Payor: [employer]H / Plan: [employer] [facility] [employer] / Product Type: *No Product type* /      Patient  verified Yes     Visit #   Current / Total 15 16-24   Time   In / Out 902 943   Pain   In / Out 0/10 0/10   Subjective Functional Status/Changes: Pt reports no pain or discomfort after new exercises added last visit. Reports decreased pressure      TREATMENT AREA =  Cervicalgia [M54.2]    OBJECTIVE         Therapeutic Procedures:    Tx Min Billable or 1:1 Min (if diff from Tx Min) Procedure, Rationale, Specifics   20  55598 Therapeutic Exercise (timed):  increase ROM, strength, coordination, balance, and proprioception to improve patient's ability to progress to PLOF and address remaining functional goals. (see flow sheet as applicable)     Details if applicable:       11  46046 Neuromuscular Re-Education (timed):  improve balance, coordination, kinesthetic sense, posture, core stability and proprioception to improve patient's ability to develop conscious control of individual muscles and awareness of position of extremities in order to progress to PLOF and address remaining functional goals. (see flow sheet as applicable)     Details if applicable:     10 10 24255 Therapeutic Activity (timed):  use of dynamic activities replicating functional movements to increase ROM, strength, coordination, balance, and proprioception in order to improve patient's ability to progress to PLOF and address remaining functional goals.  (see flow sheet as applicable)     Details if applicable:            Details if applicable:            Details if applicable:     41 41 [facility] Totals Reminder: bill using total billable min of TIMED therapeutic procedures (example: do not include dry needle or estim unattended, both untimed codes, in totals to left)  8-22 min = 1 unit; 23-37 min = 2 units; 38-52 min

## 2024-03-12 ENCOUNTER — HOSPITAL ENCOUNTER (OUTPATIENT)
Facility: HOSPITAL | Age: 54
Setting detail: RECURRING SERIES
Discharge: HOME | End: 2024-03-15
Payer: COMMERCIAL

## 2024-03-12 PROCEDURE — 97110 THERAPEUTIC EXERCISES: CPT

## 2024-03-12 PROCEDURE — 97140 MANUAL THERAPY 1/> REGIONS: CPT

## 2024-03-12 PROCEDURE — 97530 THERAPEUTIC ACTIVITIES: CPT

## 2024-03-12 PROCEDURE — 97112 NEUROMUSCULAR REEDUCATION: CPT

## 2024-03-12 NOTE — PROGRESS NOTES
PHYSICAL / OCCUPATIONAL THERAPY - DAILY TREATMENT NOTE    Patient Name: Reinier Rossi    Date: 3/12/2024    : 1970  Insurance: Payor: [employer]H / Plan: [employer] / Product Type: *No Product type* /      Patient  verified Yes     Visit #   Current / Total 16 16-24   Time   In / Out 8:26 9:04   Pain   In / Out 0/10 0/10   Subjective Functional Status/Changes: Pt states he woke up Friday evening with increased pain/inflammation on the L side of his face and upper body, states he did some stretching and exercises and sx subsided.     Functional Gains: dec pressure in front teeth and tib of tongue, decreased tension in jaw, improved taste, sx more intermittent vs constant, dec frequency and intensity of headaches,   Functional Deficits: continued intermittent pressure in jaw, taste changes, neck tension, and shoulder pain     % improvement: 65% improvement     Pain   Average: 0/10                  Best: 0/10                Worst: 6/10     TREATMENT AREA =  Cervicalgia [M54.2]    OBJECTIVE         Therapeutic Procedures:    Tx Min Billable or 1:1 Min (if diff from Tx Min) Procedure, Rationale, Specifics   15 15 70639 Therapeutic Activity (timed):  use of dynamic activities replicating functional movements to increase ROM, strength, coordination, balance, and proprioception in order to improve patient's ability to progress to PLOF and address remaining functional goals.  (see flow sheet as applicable)     Details if applicable:       15 15 72354 Neuromuscular Re-Education (timed):  improve balance, coordination, kinesthetic sense, posture, core stability and proprioception to improve patient's ability to develop conscious control of individual muscles and awareness of position of extremities in order to progress to PLOF and address remaining functional goals. (see flow sheet as applicable)     Details if applicable:      92003 Manual Therapy (timed):  decrease pain, increase tissue

## 2024-03-12 NOTE — THERAPY RECERTIFICATION
4-5/10)  Current Status: not met; reports flare of sx 3 days ago up to 6/10  Goal Met?  no, regression     3. Pt will demo improved c/s Rotation to 80 deg bilaterally in order to improve tolerance to looking over shoulder while driving, performing work duties.   Status at last Eval: RR 67, LR 75  Current Status: RR 72, LR 72  Goal Met?  progress     4. Pt will demo through understanding of final HEP in preparation for DC.   Status at last Eval: compliant with initial HEP  Current Status: ongoing compliance to HEP, needs progression  Goal Met?  yes    Payor: [employer]H / Plan: [employer] / Product Type: *No Product type* /     Non-Medicare, can change goals, can adjust or add frequency duration, no signature required      New Goals to be achieved in __3__ WEEKS  1. Pt will report no radicular S&S in the UE or jaw   Status at PN: continued intermittent pressure in jaw and taste related changes, though notes to be less in severity. Constant tinnitus in L ear. Sx reproduced with TPR to L SCM  2. Dec max pain to </= 3/10 for improved tolerance to ADLs  Status at PN: 6/10  3. Pt will demo improved c/s Rotation to 80 deg bilaterally in order to improve tolerance to looking over shoulder while driving, performing work duties.   Status at PN: RR 72, LR 72  4. Improve deep cervical flexor endurance to >20\" without compensation to improve c/s stability for work duties  Status at PN: 12\" w/ SCM compensation B      Frequency / Duration:   Patient to be seen   1   times per week for   3    WEEKS    RECOMMENDATIONS  We would like to continue therapy for progress to goals stated above.  Continue per initial Plan of Care.    If you have any questions/comments please contact us directly.  Thank you for allowing us to assist in the care of your patient.    Rosanna Melo, PT       3/12/2024       9:27 AM

## 2024-03-21 ENCOUNTER — APPOINTMENT (OUTPATIENT)
Facility: HOSPITAL | Age: 54
End: 2024-03-21
Payer: COMMERCIAL

## 2024-03-26 ENCOUNTER — APPOINTMENT (OUTPATIENT)
Facility: HOSPITAL | Age: 54
End: 2024-03-26
Payer: COMMERCIAL